# Patient Record
Sex: FEMALE | Race: OTHER
[De-identification: names, ages, dates, MRNs, and addresses within clinical notes are randomized per-mention and may not be internally consistent; named-entity substitution may affect disease eponyms.]

---

## 2019-09-16 ENCOUNTER — HOSPITAL ENCOUNTER (EMERGENCY)
Dept: HOSPITAL 62 - ER | Age: 19
Discharge: HOME | End: 2019-09-16
Payer: SELF-PAY

## 2019-09-16 VITALS — SYSTOLIC BLOOD PRESSURE: 113 MMHG | DIASTOLIC BLOOD PRESSURE: 64 MMHG

## 2019-09-16 DIAGNOSIS — O21.9: Primary | ICD-10-CM

## 2019-09-16 DIAGNOSIS — Z3A.12: ICD-10-CM

## 2019-09-16 LAB
ADD MANUAL DIFF: NO
ALBUMIN SERPL-MCNC: 4.7 G/DL (ref 3.7–5.6)
ALP SERPL-CCNC: 60 U/L (ref 50–135)
ANION GAP SERPL CALC-SCNC: 14 MMOL/L (ref 5–19)
APPEARANCE UR: (no result)
APTT PPP: (no result) S
AST SERPL-CCNC: 24 U/L (ref 5–30)
BASOPHILS # BLD AUTO: 0.1 10^3/UL (ref 0–0.2)
BASOPHILS NFR BLD AUTO: 0.4 % (ref 0–2)
BILIRUB DIRECT SERPL-MCNC: 0 MG/DL (ref 0–0.4)
BILIRUB SERPL-MCNC: 0.3 MG/DL (ref 0.2–1.3)
BILIRUB UR QL STRIP: NEGATIVE
BUN SERPL-MCNC: 6 MG/DL (ref 7–20)
CALCIUM: 10.1 MG/DL (ref 8.4–10.2)
CHLORIDE SERPL-SCNC: 99 MMOL/L (ref 98–107)
CO2 SERPL-SCNC: 24 MMOL/L (ref 22–30)
EOSINOPHIL # BLD AUTO: 0 10^3/UL (ref 0–0.6)
EOSINOPHIL NFR BLD AUTO: 0.2 % (ref 0–6)
ERYTHROCYTE [DISTWIDTH] IN BLOOD BY AUTOMATED COUNT: 11.8 % (ref 11.5–14)
GLUCOSE SERPL-MCNC: 94 MG/DL (ref 75–110)
GLUCOSE UR STRIP-MCNC: NEGATIVE MG/DL
HCT VFR BLD CALC: 41.7 % (ref 36–47)
HGB BLD-MCNC: 14.3 G/DL (ref 12–15.5)
KETONES UR STRIP-MCNC: 300 MG/DL
LYMPHOCYTES # BLD AUTO: 0.9 10^3/UL (ref 0.5–4.7)
LYMPHOCYTES NFR BLD AUTO: 6 % (ref 13–45)
MCH RBC QN AUTO: 30.8 PG (ref 27–33.4)
MCHC RBC AUTO-ENTMCNC: 34.3 G/DL (ref 32–36)
MCV RBC AUTO: 90 FL (ref 80–97)
MONOCYTES # BLD AUTO: 0.6 10^3/UL (ref 0.1–1.4)
MONOCYTES NFR BLD AUTO: 3.7 % (ref 3–13)
NEUTROPHILS # BLD AUTO: 14.2 10^3/UL (ref 1.7–8.2)
NEUTS SEG NFR BLD AUTO: 89.7 % (ref 42–78)
NITRITE UR QL STRIP: NEGATIVE
PH UR STRIP: 5 [PH] (ref 5–9)
PLATELET # BLD: 325 10^3/UL (ref 150–450)
POTASSIUM SERPL-SCNC: 4.2 MMOL/L (ref 3.6–5)
PROT SERPL-MCNC: 7.9 G/DL (ref 6.3–8.2)
PROT UR STRIP-MCNC: NEGATIVE MG/DL
RBC # BLD AUTO: 4.65 10^6/UL (ref 3.72–5.28)
SP GR UR STRIP: 1.03
TOTAL CELLS COUNTED % (AUTO): 100 %
UROBILINOGEN UR-MCNC: NEGATIVE MG/DL (ref ?–2)
WBC # BLD AUTO: 15.8 10^3/UL (ref 4–10.5)

## 2019-09-16 PROCEDURE — 96361 HYDRATE IV INFUSION ADD-ON: CPT

## 2019-09-16 PROCEDURE — 83690 ASSAY OF LIPASE: CPT

## 2019-09-16 PROCEDURE — 85025 COMPLETE CBC W/AUTO DIFF WBC: CPT

## 2019-09-16 PROCEDURE — 36415 COLL VENOUS BLD VENIPUNCTURE: CPT

## 2019-09-16 PROCEDURE — 87086 URINE CULTURE/COLONY COUNT: CPT

## 2019-09-16 PROCEDURE — 99284 EMERGENCY DEPT VISIT MOD MDM: CPT

## 2019-09-16 PROCEDURE — 80053 COMPREHEN METABOLIC PANEL: CPT

## 2019-09-16 PROCEDURE — 84702 CHORIONIC GONADOTROPIN TEST: CPT

## 2019-09-16 PROCEDURE — 81001 URINALYSIS AUTO W/SCOPE: CPT

## 2019-09-16 PROCEDURE — 96374 THER/PROPH/DIAG INJ IV PUSH: CPT

## 2019-09-16 NOTE — ER DOCUMENT REPORT
ED Medical Screen (RME)





- General


Chief Complaint: Abdominal Pain


Stated Complaint: NAUSEA/ABDOMINAL PAIN


Time Seen by Provider: 09/16/19 15:52


Mode of Arrival: Ambulatory


Information source: Patient


Notes: 





Patient is an otherwise healthy 18-year-old female G1, P0 presenting to the 

emergency department chief complaint of upper abdominal pain and vomiting.  

Patient reports she has had multiple episodes of vomiting today, denies any 

diarrhea, reports possible fever.  She denies any abnormal vaginal bleeding or 

discharge.  She thinks she is approximately 12 weeks pregnant.





Exam:


Patient alert, oriented, answering all questions appropriately, laughing during 

triage.


No acute distress noted.


Abdomen soft, nontender.





I have greeted and performed a rapid initial assessment of this patient.  A 

comprehensive ED assessment and evaluation of the patient, analysis of test 

results and completion of the medical decision making process will be conducted 

by additional ED providers.  I have specifically instructed the patient or 

family members with the patient to immediately return to any nursing staff 

should anything change in the patient's condition or with their chief complaint.





This medical record was dictated with voice recognizing software.  There may be 

grammatical, syntax errors that are unintended.





TRAVEL OUTSIDE OF THE [place] IN LAST 30 DAYS: No





- Related Data


Allergies/Adverse Reactions: 


                                        





No Known Allergies Allergy (Verified 09/16/19 15:21)


   











Past Medical History





- Social History


Frequency of alcohol use: None


Drug Abuse: None





Physical Exam





- Vital signs


Vitals: 





                                        











Temp Pulse Resp BP Pulse Ox


 


 98.2 F   81   16   125/76   100 


 


 09/16/19 15:30  09/16/19 15:30  09/16/19 15:30  09/16/19 15:30  09/16/19 15:30














Course





- Vital Signs


Vital signs: 





                                        











Temp Pulse Resp BP Pulse Ox


 


 98.2 F   81   16   125/76   100 


 


 09/16/19 15:30  09/16/19 15:30  09/16/19 15:30  09/16/19 15:30  09/16/19 15:30

## 2019-09-16 NOTE — ER DOCUMENT REPORT
ED General





- General


Chief Complaint: Abdominal Pain


Stated Complaint: NAUSEA/ABDOMINAL PAIN


Time Seen by Provider: 09/16/19 15:52


Primary Care Provider: 


LACHO RAMIREZ MD [ACTIVE STAFF] - Follow up as needed


Mode of Arrival: Ambulatory


TRAVEL OUTSIDE OF THE [place] IN LAST 30 DAYS: No





- HPI


Notes: 





Patient woke this morning presently 12 weeks pregnant she states that G1 with 

presently 5 episodes of vomiting nonbloody nonbilious and upright epigastric 

pain.  No known medical problems does not take any medications daily basis.  

Symptoms have improved in the emergency department after nausea medication.





- Related Data


Allergies/Adverse Reactions: 


                                        





No Known Allergies Allergy (Verified 09/16/19 15:21)


   











Past Medical History





- General


Information source: Patient





- Social History


Smoking Status: Never Smoker


Frequency of alcohol use: None


Drug Abuse: None


Family History: Reviewed & Not Pertinent


Patient has suicidal ideation: No


Patient has homicidal ideation: No





Review of Systems





- Review of Systems


Constitutional: No symptoms reported


EENT: No symptoms reported


Cardiovascular: No symptoms reported


Respiratory: No symptoms reported


Gastrointestinal: See HPI


Genitourinary: No symptoms reported


Female Genitourinary: No symptoms reported


Musculoskeletal: No symptoms reported


Skin: No symptoms reported


Hematologic/Lymphatic: No symptoms reported


Neurological/Psychological: No symptoms reported





Physical Exam





- Vital signs


Vitals: 


                                        











Temp Pulse Resp BP Pulse Ox


 


 98.2 F   81   16   125/76   100 


 


 09/16/19 15:30  09/16/19 15:30  09/16/19 15:30  09/16/19 15:30  09/16/19 15:30














- General


General appearance: Appears well, Alert





- HEENT


Head: Normocephalic, Atraumatic


Eyes: Normal


Conjunctiva: Normal


Cornea: Normal


Extraocular movements intact: Yes


Pupils: PERRL





- Respiratory


Respiratory status: No respiratory distress


Chest status: Nontender


Breath sounds: Normal





- Cardiovascular


Rhythm: Regular


Heart sounds: Normal auscultation


Murmur: No





- Abdominal


Inspection: Normal


Distension: No distension


Bowel sounds: Normal


Tenderness: Nontender





- Neurological


Neuro grossly intact: Yes


Cognition: Normal


Orientation: AAOx4





- Psychological


Associated symptoms: Normal affect





Course





- Re-evaluation


Re-evalutation: 





09/16/19 18:53


Patient found to have nonspecific leukocytosis.  She has had intermittent 

vomiting today.  She is asymptomatic emerge part with no abdominal pain at this 

time.  She has been having a sour taste in the throat.  Will provide Zantac as 

well as Reglan for nausea.  Referral provided she does not have one.  Return 

precautions provided.


09/16/19 18:55


Bedside ultrasound performed by me.  Inferior pregnancy identified with present 

fetal heart tones and movement.  No dysuria urine will be cultured





- Vital Signs


Vital signs: 


                                        











Temp Pulse Resp BP Pulse Ox


 


 98.1 F   84   16   113/64   100 


 


 09/16/19 19:29  09/16/19 19:29  09/16/19 19:29  09/16/19 19:29  09/16/19 19:29














- Laboratory


Result Diagrams: 


                                 09/16/19 16:13





                                 09/16/19 16:13


Laboratory results interpreted by me: 


                                        











  09/16/19 09/16/19 09/16/19





  16:13 16:13 16:13


 


WBC  15.8 H  


 


Lymph % (Auto)  6.0 L  


 


Absolute Neuts (auto)  14.2 H  


 


Seg Neutrophils %  89.7 H  


 


Sodium   136.7 L 


 


BUN   6 L 


 


Beta HCG, Quant   603848.00 H 


 


Urine Ketones    300 H


 


Urine Ascorbic Acid    40 H














Discharge





- Discharge


Clinical Impression: 


Nausea & vomiting


Qualifiers:


 Vomiting type: unspecified Vomiting Intractability: non-intractable Qualified 

Code(s): R11.2 - Nausea with vomiting, unspecified





Condition: Good


Disposition: HOME, SELF-CARE


Instructions:  Pregnancy ([facility]), Reglan ([facility]), Vomiting ([facility])


Prescriptions: 


Metoclopramide HCl [Reglan 10 mg Tablet] 1 tab PO ASDIR PRN #25 tablet


 PRN Reason: 


Ranitidine HCl [Zantac] 150 mg PO BID #30 tablet


Referrals: 


LACHO RAMIREZ MD [ACTIVE STAFF] - Follow up as needed

## 2019-10-08 ENCOUNTER — HOSPITAL ENCOUNTER (EMERGENCY)
Dept: HOSPITAL 62 - ER | Age: 19
Discharge: HOME | End: 2019-10-08
Payer: MEDICAID

## 2019-10-08 VITALS — DIASTOLIC BLOOD PRESSURE: 80 MMHG | SYSTOLIC BLOOD PRESSURE: 118 MMHG

## 2019-10-08 DIAGNOSIS — R25.2: ICD-10-CM

## 2019-10-08 DIAGNOSIS — Z3A.15: ICD-10-CM

## 2019-10-08 DIAGNOSIS — Z79.899: ICD-10-CM

## 2019-10-08 DIAGNOSIS — O26.892: ICD-10-CM

## 2019-10-08 DIAGNOSIS — Z87.891: ICD-10-CM

## 2019-10-08 DIAGNOSIS — O20.9: Primary | ICD-10-CM

## 2019-10-08 LAB
ADD MANUAL DIFF: NO
ALBUMIN SERPL-MCNC: 4 G/DL (ref 3.7–5.6)
ALP SERPL-CCNC: 50 U/L (ref 50–135)
ANION GAP SERPL CALC-SCNC: 11 MMOL/L (ref 5–19)
APPEARANCE UR: CLEAR
APTT PPP: YELLOW S
AST SERPL-CCNC: 26 U/L (ref 5–30)
BASOPHILS # BLD AUTO: 0 10^3/UL (ref 0–0.2)
BASOPHILS NFR BLD AUTO: 0.4 % (ref 0–2)
BILIRUB DIRECT SERPL-MCNC: 0.1 MG/DL (ref 0–0.4)
BILIRUB SERPL-MCNC: 0.2 MG/DL (ref 0.2–1.3)
BILIRUB UR QL STRIP: NEGATIVE
BUN SERPL-MCNC: 7 MG/DL (ref 7–20)
CALCIUM: 9.6 MG/DL (ref 8.4–10.2)
CHLORIDE SERPL-SCNC: 101 MMOL/L (ref 98–107)
CO2 SERPL-SCNC: 23 MMOL/L (ref 22–30)
EOSINOPHIL # BLD AUTO: 0.1 10^3/UL (ref 0–0.6)
EOSINOPHIL NFR BLD AUTO: 1.2 % (ref 0–6)
ERYTHROCYTE [DISTWIDTH] IN BLOOD BY AUTOMATED COUNT: 11.6 % (ref 11.5–14)
GLUCOSE SERPL-MCNC: 101 MG/DL (ref 75–110)
GLUCOSE UR STRIP-MCNC: NEGATIVE MG/DL
HCT VFR BLD CALC: 37.9 % (ref 36–47)
HGB BLD-MCNC: 13.1 G/DL (ref 12–15.5)
KETONES UR STRIP-MCNC: NEGATIVE MG/DL
LYMPHOCYTES # BLD AUTO: 1.5 10^3/UL (ref 0.5–4.7)
LYMPHOCYTES NFR BLD AUTO: 13.5 % (ref 13–45)
MCH RBC QN AUTO: 31.1 PG (ref 27–33.4)
MCHC RBC AUTO-ENTMCNC: 34.4 G/DL (ref 32–36)
MCV RBC AUTO: 90 FL (ref 80–97)
MONOCYTES # BLD AUTO: 0.7 10^3/UL (ref 0.1–1.4)
MONOCYTES NFR BLD AUTO: 6 % (ref 3–13)
NEUTROPHILS # BLD AUTO: 8.6 10^3/UL (ref 1.7–8.2)
NEUTS SEG NFR BLD AUTO: 78.9 % (ref 42–78)
NITRITE UR QL STRIP: NEGATIVE
PH UR STRIP: 6 [PH] (ref 5–9)
PLATELET # BLD: 286 10^3/UL (ref 150–450)
POTASSIUM SERPL-SCNC: 3.9 MMOL/L (ref 3.6–5)
PROT SERPL-MCNC: 7.3 G/DL (ref 6.3–8.2)
PROT UR STRIP-MCNC: NEGATIVE MG/DL
RBC # BLD AUTO: 4.2 10^6/UL (ref 3.72–5.28)
SP GR UR STRIP: 1.01
TOTAL CELLS COUNTED % (AUTO): 100 %
UROBILINOGEN UR-MCNC: NEGATIVE MG/DL (ref ?–2)
WBC # BLD AUTO: 10.9 10^3/UL (ref 4–10.5)

## 2019-10-08 PROCEDURE — 86901 BLOOD TYPING SEROLOGIC RH(D): CPT

## 2019-10-08 PROCEDURE — 85025 COMPLETE CBC W/AUTO DIFF WBC: CPT

## 2019-10-08 PROCEDURE — 80053 COMPREHEN METABOLIC PANEL: CPT

## 2019-10-08 PROCEDURE — 76805 OB US >/= 14 WKS SNGL FETUS: CPT

## 2019-10-08 PROCEDURE — 93976 VASCULAR STUDY: CPT

## 2019-10-08 PROCEDURE — 86900 BLOOD TYPING SEROLOGIC ABO: CPT

## 2019-10-08 PROCEDURE — 99284 EMERGENCY DEPT VISIT MOD MDM: CPT

## 2019-10-08 PROCEDURE — 36415 COLL VENOUS BLD VENIPUNCTURE: CPT

## 2019-10-08 PROCEDURE — 84702 CHORIONIC GONADOTROPIN TEST: CPT

## 2019-10-08 PROCEDURE — 81001 URINALYSIS AUTO W/SCOPE: CPT

## 2019-10-08 NOTE — ER DOCUMENT REPORT
ED Medical Screen (RME)





- General


Stated Complaint: VAGINAL BLEEDING


Time Seen by Provider: 10/08/19 16:13


Notes: 





Patient is a G1, P0 14-weeks pregnant 18-year-old female who presents the 

emergency with vaginal bleeding.  Her bleeding started about an hour ago.  She 

states that she lifted to ice buckets yesterday and ended up having some vaginal

bleeding this morning.  Patient admits to having sex 2 days ago and had some 

bleeding then also.  She is taking prenatal vitamins.  Denies any past medical 

history.  Denies any abdominal pain.  Denies dysuria, pelvic pain, vaginal 

discharge, or vaginal odors.





Exam: Soft nontender abdomen.  Exam limited due to patient in sitting position.





I have greeted and performed a rapid initial assessment of this patient.  A 

comprehensive ED assessment and evaluation of the patient, analysis of test 

results and completion of medical decision making process will be conducted by 

an additional ED providers.  


TRAVEL OUTSIDE OF THE [place] IN LAST 30 DAYS: No





- Related Data


Allergies/Adverse Reactions: 


                                        





No Known Allergies Allergy (Verified 10/08/19 16:09)


   











Past Medical History





- Social History


Chew tobacco use (# tins/day): No


Frequency of alcohol use: None


Drug Abuse: None





Physical Exam





- Vital signs


Vitals: 





                                        











Temp Pulse Resp BP Pulse Ox


 


 98.7 F   95 H  15   136/82 H  100 


 


 10/08/19 15:28  10/08/19 15:28  10/08/19 15:28  10/08/19 15:28  10/08/19 15:28














Course





- Vital Signs


Vital signs: 





                                        











Temp Pulse Resp BP Pulse Ox


 


 98.7 F   95 H  15   136/82 H  100 


 


 10/08/19 15:28  10/08/19 15:28  10/08/19 15:28  10/08/19 15:28  10/08/19 15:28

## 2019-10-08 NOTE — ER DOCUMENT REPORT
ED GI/





- General


Chief Complaint: Vaginal Bleeding


Stated Complaint: VAGINAL BLEEDING


Time Seen by Provider: 10/08/19 16:13


Mode of Arrival: Ambulatory


Information source: Patient


Notes: 





18-year-old female presented to ED for complaint of vaginal bleeding.  She 

states that started about an hour before coming in.  She states she lifted some 

ice buckets yesterday and then had some vaginal bleeding this morning.  She also

had some sexual intercourse 2 days ago.  She states she is taking her prenatal 

vitamins.  She is  1 para 0 and 15 weeks pregnant.  Patient is alert 

oriented respirations regular nonlabored speaking in full sentences.  She states

that the vaginal bleeding has stopped and she is not having any pain.


TRAVEL OUTSIDE OF THE [place] IN LAST 30 DAYS: No





- HPI


Patient complains to provider of: Pregnant, Vaginal bleeding


Onset: This morning


Timing/Duration: Gone


Quality of pain: No pain


Severity at maximum: Mild


Severity in ED: None


Pain Level: Denies


Location: Pelvis


Vaginal bleeding (Compared to normal period): Lighter


: 1


Para: 0


Fetal heart tones (bpm): 169


ABO type: A


Rh factor: Positive


OB ultrasound done: Yes


Associated symptoms: Other - Mild cramping vaginal bleeding all resolved


Exacerbated by: Denies


Relieved by: Denies


Recently seen / treated by doctor: No





- Related Data


Allergies/Adverse Reactions: 


                                        





No Known Allergies Allergy (Verified 10/08/19 16:09)


   











Past Medical History





- General


Information source: Patient


Last Menstrual Period: 18





- Social History


Smoking Status: Former Smoker - As


Chew tobacco use (# tins/day): No


Frequency of alcohol use: None


Drug Abuse: None


Occupation: 


Lives with: Spouse/Significant other


Family History: Reviewed & Not Pertinent


Patient has suicidal ideation: No


Patient has homicidal ideation: No





- Past Medical History


Cardiac Medical History: Reports: None


Pulmonary Medical History: Reports: None


EENT Medical History: Reports: None


Neurological Medical History: Reports: None


Endocrine Medical History: Reports: None


Renal/ Medical History: Reports: None


Malignancy Medical History: Reports: None


GI Medical History: Reports: None


Musculoskeletal Medical History: Reports None


Skin Medical History: Reports None


Psychiatric Medical History: Reports: None


Traumatic Medical History: Reports: None


Infectious Medical History: Reports: None


Surgical Hx: Negative


Past Surgical History: Reports: None





- Immunizations


Immunizations up to date: Yes


Hx Diphtheria, Pertussis, Tetanus Vaccination: Yes





Review of Systems





- Review of Systems


Constitutional: No symptoms reported


EENT: No symptoms reported


Cardiovascular: No symptoms reported


Respiratory: No symptoms reported


Gastrointestinal: No symptoms reported


Genitourinary: No symptoms reported


Female Genitourinary: Pregnant, Vaginal bleeding


Musculoskeletal: No symptoms reported


Skin: No symptoms reported


Hematologic/Lymphatic: No symptoms reported


Neurological/Psychological: No symptoms reported


-: Yes All other systems reviewed and negative





Physical Exam





- Vital signs


Vitals: 





                                        











Temp Pulse Resp BP Pulse Ox


 


 98.7 F   95 H  15   136/82 H  100 


 


 10/08/19 15:28  10/08/19 15:28  10/08/19 15:28  10/08/19 15:28  10/08/19 15:28











Interpretation: Normal





- General


General appearance: Appears well, Alert





- HEENT


Head: Normocephalic, Atraumatic


Eyes: Normal


Pupils: PERRL





- Respiratory


Respiratory status: No respiratory distress


Chest status: Nontender


Breath sounds: Normal


Chest palpation: Normal





- Cardiovascular


Rhythm: Regular


Heart sounds: Normal auscultation


Murmur: No





- Abdominal


Inspection: Normal, Gravid female


Distension: No distension


Bowel sounds: Normal


Tenderness: Nontender.  No: Tender


Organomegaly: No organomegaly





- Back


Back: Normal, Nontender





- Extremities


General upper extremity: Normal inspection, Nontender, Normal color, Normal ROM,

Normal temperature


General lower extremity: Normal inspection, Nontender, Normal color, Normal ROM,

Normal temperature, Normal weight bearing.  No: Syl's sign





- Neurological


Neuro grossly intact: Yes


Cognition: Normal


Orientation: AAOx4


[place] Coma Scale Eye Opening: Spontaneous


[place] Coma Scale Verbal: Oriented


[place] Coma Scale Motor: Obeys Commands


[place] Coma Scale Total: 15


Speech: Normal


Motor strength normal: LUE, RUE, LLE, RLE


Sensory: Normal





- Psychological


Associated symptoms: Normal affect, Normal mood





- Skin


Skin Temperature: Warm


Skin Moisture: Dry


Skin Color: Normal





Course





- Re-evaluation


Re-evalutation: 





10/08/19 19:52


Labs and ultrasound discussed with patient and results of labs and ultrasound 

given to patient to follow-up with primary care doctor.  Patient to follow-up 

with OB/GYN as soon as her Medicaid goes through.  Patient is alert oriented 

respirations regular nonlabored speaking in full sentences she will be 

discharged.





- Vital Signs


Vital signs: 





                                        











Temp Pulse Resp BP Pulse Ox


 


 98.7 F   95 H  15   136/82 H  100 


 


 10/08/19 15:28  10/08/19 15:28  10/08/19 15:28  10/08/19 15:28  10/08/19 15:28














- Laboratory


Result Diagrams: 


                                 10/08/19 16:28





                                 10/08/19 16:28


Laboratory results interpreted by me: 





                                        











  10/08/19 10/08/19 10/08/19





  16:28 16:28 16:28


 


WBC  10.9 H  


 


Absolute Neuts (auto)  8.6 H  


 


Seg Neutrophils %  78.9 H  


 


Sodium   


 


Beta HCG, Quant   96952.00 H 


 


Urine Blood    LARGE H














  10/08/19





  16:28


 


WBC 


 


Absolute Neuts (auto) 


 


Seg Neutrophils % 


 


Sodium  135.0 L


 


Beta HCG, Quant 


 


Urine Blood 














- Diagnostic Test


Radiology reviewed: Image reviewed, Reports reviewed





Discharge





- Discharge


Clinical Impression: 


 Vaginal bleeding in pregnancy





Condition: Stable


Disposition: HOME, SELF-CARE


Additional Instructions: 


PREGNANCY:





     You are pregnant.  Prenatal care is best started as early in pregnancy as 

possible.


     If you're unsure about continuing this pregnancy, you should discuss this 

with your physician or with counsellors at [facility].


     You should take only medications approved by your physician. Acetaminophen 

can safely be taken for minor pains.  As a rule, medication for chronic 

conditions such as asthma or seizures can safely be continued.  You should di

scuss with the physician every medicine you take.


     Any regular exercise program can be continued.  Talk to your physician, 

however, before engaging in competitive or demanding sports.


     Alcohol, smoking, and "street drugs" are dangerous to your baby. Cocaine is

especially dangerous.  Don't use any illicit drugs!








BLEEDING DURING EARLY PREGNANCY:





     You have been evaluated for passing blood while pregnant. While we take 

this symptom very seriously, most women with your degree of bleeding will go on 

to have a perfectly normal baby. At this time, there is no indication that a 

miscarriage will occur. (A miscarriage occurs when the fetus is abnormal.  There

is no medicine or treatment to prevent it.)


     A more serious cause of bleeding is tubal (or ectopic) pregnancy. An 

ultrasound usually can show whether the pregnancy is in the uterus or in the 

tube. Sometimes in early pregnancy, no fetus is seen. In this case, careful 

follow-up, including repeat blood tests and repeat ultrasound, is necessary.


     Do not douche or have sex for at least a week, or until OK'd by the doctor.

Don't use tampons.


     Call the doctor or return for re-examination if there is an increase in ble

eding or cramping, extreme weakness, fainting, new abdominal pain, fever, or 

passage of tissue.





Acetaminophen





     Acetaminophen may be taken for pain relief or fever control. It's much 

safer than aspirin, offering a wider range of "safe" dosages.  It is safe during

pregnancy.  Some brand names are Tylenol, Panadol, Datril, Anacin 3, Tempra, and

Liquiprin. Acetaminophen can be repeated every four hours.  The following are 

maximum recommended dosages:





WEIGHT         Dose             Drops                  Elixir        

Chewable(80mg)


(LBS.)                            drprs=droppers    tsp=teaspoon


6                 40 mg            .4 ml (1/2)


6-11            80 mg            .8 ml (full)            1/2   tsp           1  

    tab


12-16         120 mg           1 1/2 drprs            3/4   tsp           1 1/2 

tabs


17-23         160 mg             2  drprs              1      tsp           2   

   tabs


24-30         240 mg             3  drprs              1 1/2 tsp           3    

  tabs


30-35         320 mg                                     2       tsp           4

      tabs


36-41         360 mg                                     2 1/4 tsp           4 

1/2  tabs


42-47         400 mg                                     2 1/2 tsp           5  

    tabs


48-53         480 mg                                     3       tsp          6 

     tabs


54-59         520 mg                                     3  1/4 tsp          6 

1/2 tabs


60-64         560 mg                                     3  1/2 tsp          7  

   tabs 


65-70         600 mg                                     3  3/4 tsp          7 

1/2 tabs


71-76         640 mg                                     4       tsp           8

     tabs


77-82         720 mg                                     4 1/2  tsp           9 

    tabs


83-88         800 mg                                     5       tsp           

10     tabs





>89 pounds or adults          650 mg to 900 mg 





Acetaminophen can be repeated every four hours. Maximum daily dose not to exceed

4000 mg.





   These maximum recommended dosages are slightly higher than the dosages 

written on the product container, but these dosages are very safe and well below

the toxic dosage for acetaminophen.





Your blood type is A+.





I have given you a copy of the lab work and the ultrasound report please take 

these with you to your follow-up OB/GYN appointment.  You are now over 15 weeks 

pregnant so that should be getting you into a OB/GYN soon.








FOLLOW-UP CARE:


If you have been referred to a physician for follow-up care, call the 

physicians office for an appointment as you were instructed or within the next 

two days.  If you experience worsening or a significant change in your symptoms 

(very heavy bleeding with large clots of blood, passage of tissue, more severe 

abdominal / pelvic pain or cramping, feeling faint or severe weakness, fever, 

etc.), notify the physician immediately or return to the Emergency Department at

any time for re-evaluation.





OBSTETRIC-GYNECOLOGIC (OB-GYN) PHYSICIANS IN [place]:








[facility]


    [address] [place] [address]


    [phone]





For active duty and  dependents diagnosed with a threatened  or 

miscarriage, you should follow up in the following manner:





      Standard patients who have a local civilian provider should follow 

up with that provider.





      Patients of the [facility] Practice [facility] should call your Team Nurse at 8:00

am the following morning for further instructions.





     If you are neither a  Standard patient nor a patient of the Family 

Practice Clinic, you should follow up at the [facility] ([facility]

 Patients already enrolled in the [facility] OB Clinic,  Prime patients not 

assigned to the Family Practice Clinic, and Active Duty patients not assigned to

Family Practice Clinic should report to the [facility] Lab at 8:00 am the next morning

that the [facility] OB Clinic is open and then you will be seen in the OB Clinic at 

11:00 am.


Forms:  Return to Work


Referrals: 


[facility] [Provider Group] - Follow up as needed

## 2019-10-08 NOTE — RADIOLOGY REPORT (SQ)
EXAM DESCRIPTION:  U/S OB 14+ TA/1 GEST W/DOPPLER



COMPLETED DATE/TIME:  10/8/2019 6:07 pm



REASON FOR STUDY:  vaginal bleeding



COMPARISON:  None.



TECHNIQUE:  Static and Dynamic grayscale imaging performed of gravid uterus using transabdominal appr
oach.  Additional selected color Doppler and spectral images recorded.  All stored on PACS.



LIMITATIONS:  None.



FINDINGS:  FETUSES SEEN:1

EGA: 15 weeks 0 days  Calculated using BPD,FL,HC,AC documented on images. 5 weeks discrepancy from cl
inical dates.

ALLY: 3/31/2020

EFW: Not calculated. Grams

PERCENTILE: Not calculated.

LV P: 2.4 cm

PLACENTA: Anterior  GRADE: I

FETAL PRESENTATION: Cephalic.

FETAL ANATOMY:

FETAL HEART RATE: 169 beats per minute.

FOUR CHAMBER HEART: Visualized.

THREE VESSEL CORD: Not verified

CORD INSERTION: Not seen

KIDNEYS AND BLADDER: Kidneys not seen.  Bladder is unremarkable.

STOMACH: Visualized. Appears normal.

SPINE: Normal as visualized.

BRAIN AND LATERAL VENTRICLES: Not well seen.

OTHER: No other significant finding.

MATERNAL ADNEXA: Maternal ovaries not visualized.

CERVICAL LENGTH: 2.8 cm.   Closed.

OTHER: No other significant finding.



IMPRESSION:  LIVING INTRAUTERINE PREGNANCY.

ESTIMATED GESTATIONAL AGE 15 weeks 0 days.

No visualized anomalies.  Limited evaluation of fetal anatomy.

Trimester of pregnancy: Second trimester - 13 weeks 1 day to 27 weeks 6 days.



TECHNICAL DOCUMENTATION:  JOB ID:  [id number]

 [employer]- All Rights Reserved



Reading location - IP/workstation name: [email or web address]

## 2020-03-27 ENCOUNTER — HOSPITAL ENCOUNTER (OUTPATIENT)
Dept: HOSPITAL 62 - LC | Age: 20
LOS: 1 days | Discharge: HOME | End: 2020-03-28
Attending: OBSTETRICS & GYNECOLOGY
Payer: MEDICAID

## 2020-03-27 DIAGNOSIS — Z3A.39: ICD-10-CM

## 2020-03-27 DIAGNOSIS — O47.1: Primary | ICD-10-CM

## 2020-03-27 PROCEDURE — 4A1HXCZ MONITORING OF PRODUCTS OF CONCEPTION, CARDIAC RATE, EXTERNAL APPROACH: ICD-10-PCS | Performed by: OBSTETRICS & GYNECOLOGY

## 2020-03-27 PROCEDURE — 80307 DRUG TEST PRSMV CHEM ANLYZR: CPT

## 2020-03-27 PROCEDURE — 81005 URINALYSIS: CPT

## 2020-03-27 PROCEDURE — 59025 FETAL NON-STRESS TEST: CPT

## 2020-03-28 ENCOUNTER — HOSPITAL ENCOUNTER (INPATIENT)
Dept: HOSPITAL 62 - LC | Age: 20
LOS: 2 days | Discharge: HOME | End: 2020-03-30
Attending: OBSTETRICS & GYNECOLOGY | Admitting: OBSTETRICS & GYNECOLOGY
Payer: MEDICAID

## 2020-03-28 DIAGNOSIS — Z3A.39: ICD-10-CM

## 2020-03-28 DIAGNOSIS — Z23: ICD-10-CM

## 2020-03-28 LAB
ADD MANUAL DIFF: NO
APPEARANCE UR: (no result)
APPEARANCE UR: CLEAR
APTT PPP: (no result) S
APTT PPP: YELLOW S
BARBITURATES UR QL SCN: NEGATIVE
BARBITURATES UR QL SCN: NEGATIVE
BASOPHILS # BLD AUTO: 0 10^3/UL (ref 0–0.2)
BASOPHILS NFR BLD AUTO: 0.2 % (ref 0–2)
BILIRUB UR QL STRIP: NEGATIVE
BILIRUB UR QL STRIP: NEGATIVE
EOSINOPHIL # BLD AUTO: 0 10^3/UL (ref 0–0.6)
EOSINOPHIL NFR BLD AUTO: 0.2 % (ref 0–6)
ERYTHROCYTE [DISTWIDTH] IN BLOOD BY AUTOMATED COUNT: 11.9 % (ref 11.5–14)
GLUCOSE UR STRIP-MCNC: NEGATIVE MG/DL
GLUCOSE UR STRIP-MCNC: NEGATIVE MG/DL
HCT VFR BLD CALC: 36.8 % (ref 36–47)
HGB BLD-MCNC: 12.6 G/DL (ref 12–15.5)
KETONES UR STRIP-MCNC: NEGATIVE MG/DL
KETONES UR STRIP-MCNC: NEGATIVE MG/DL
LYMPHOCYTES # BLD AUTO: 1.5 10^3/UL (ref 0.5–4.7)
LYMPHOCYTES NFR BLD AUTO: 11.1 % (ref 13–45)
MCH RBC QN AUTO: 31.7 PG (ref 27–33.4)
MCHC RBC AUTO-ENTMCNC: 34.3 G/DL (ref 32–36)
MCV RBC AUTO: 92 FL (ref 80–97)
METHADONE UR QL SCN: NEGATIVE
METHADONE UR QL SCN: NEGATIVE
MONOCYTES # BLD AUTO: 1.1 10^3/UL (ref 0.1–1.4)
MONOCYTES NFR BLD AUTO: 7.8 % (ref 3–13)
NEUTROPHILS # BLD AUTO: 10.9 10^3/UL (ref 1.7–8.2)
NEUTS SEG NFR BLD AUTO: 80.7 % (ref 42–78)
NITRITE UR QL STRIP: NEGATIVE
NITRITE UR QL STRIP: NEGATIVE
PCP UR QL SCN: NEGATIVE
PCP UR QL SCN: NEGATIVE
PH UR STRIP: 8 [PH] (ref 5–9)
PH UR STRIP: 8 [PH] (ref 5–9)
PLATELET # BLD: 194 10^3/UL (ref 150–450)
PROT UR STRIP-MCNC: NEGATIVE MG/DL
PROT UR STRIP-MCNC: NEGATIVE MG/DL
RBC # BLD AUTO: 3.99 10^6/UL (ref 3.72–5.28)
SP GR UR STRIP: 1
SP GR UR STRIP: 1
TOTAL CELLS COUNTED % (AUTO): 100 %
URINE AMPHETAMINES SCREEN: NEGATIVE
URINE AMPHETAMINES SCREEN: NEGATIVE
URINE BENZODIAZEPINES SCREEN: NEGATIVE
URINE BENZODIAZEPINES SCREEN: NEGATIVE
URINE COCAINE SCREEN: NEGATIVE
URINE COCAINE SCREEN: NEGATIVE
URINE MARIJUANA (THC) SCREEN: NEGATIVE
URINE MARIJUANA (THC) SCREEN: NEGATIVE
UROBILINOGEN UR-MCNC: NEGATIVE MG/DL (ref ?–2)
UROBILINOGEN UR-MCNC: NEGATIVE MG/DL (ref ?–2)
WBC # BLD AUTO: 13.5 10^3/UL (ref 4–10.5)

## 2020-03-28 PROCEDURE — 86592 SYPHILIS TEST NON-TREP QUAL: CPT

## 2020-03-28 PROCEDURE — 86850 RBC ANTIBODY SCREEN: CPT

## 2020-03-28 PROCEDURE — 86900 BLOOD TYPING SEROLOGIC ABO: CPT

## 2020-03-28 PROCEDURE — 85025 COMPLETE CBC W/AUTO DIFF WBC: CPT

## 2020-03-28 PROCEDURE — 80307 DRUG TEST PRSMV CHEM ANLYZR: CPT

## 2020-03-28 PROCEDURE — 85027 COMPLETE CBC AUTOMATED: CPT

## 2020-03-28 PROCEDURE — 90715 TDAP VACCINE 7 YRS/> IM: CPT

## 2020-03-28 PROCEDURE — 36415 COLL VENOUS BLD VENIPUNCTURE: CPT

## 2020-03-28 PROCEDURE — 81005 URINALYSIS: CPT

## 2020-03-28 PROCEDURE — 86901 BLOOD TYPING SEROLOGIC RH(D): CPT

## 2020-03-28 RX ADMIN — FAMOTIDINE SCH: 20 TABLET, FILM COATED ORAL at 23:46

## 2020-03-28 NOTE — DELIVERY SUMMARY
=================================================================

[place] Sum A-C

=================================================================

Datetime Report Generated by CPN: 2020 23:33

   

   

=================================================================

DELIVERY PERSONNEL

=================================================================

   

DELIVERY PERSONNEL:  [id number]

Delivery Doctor::  Marni Rueda MD

Labor and Delivery Nurse::  Dagn Corona RN

Labor and Delivery Nurse::  Vanessa Escobar RN

Scrub Tech/CNA:  Angiemarcelle Belcher, ST

   

=================================================================

MATERNAL INFORMATION

=================================================================

   

Delivery Anesthesia:  None

Medications After Delivery:  Pitocin Bolus-Please Comment; Cytotec

   1000mcg Per Rectum/Vagina

Meds After Delivery Comment:  Pitocin 20 units/1000 ML bolus started

   following placenta

Estimated  Blood Loss (ml):  40

Maternal Complications:  None

Provider Comments:  VFI delivered in MALOU presentation.  No nuchal cord.

   Shoulders and body delivered without difficulty.  Cord doubly

   clamped and cut and infant to maternal abdomen for NRP.  Placenta

   delivered intact spontaneously.  FF at U.  Cytotec 1000mcg per

   rectum given due to uterine atony.  Mother and baby stable upon

   provider leaving the room

   

=================================================================

LABOR SUMMARY

=================================================================

   

EDC:  2020 00:00

No. Babies in Womb:  1

 Attempted:  No

   

=================================================================

LABOR INFORMATION

=================================================================

   

Reason for Induction:  Not Applicable

Onset of Labor:  2020 16:30

Complete Dilatation:  2020 21:12

Cervical Ripening Agents:  Cytotec @ 1000

Oxytocin:  N/A

Group B Beta Strep:  negative

Antibiotics # of Doses:  0

Antibiotics Time of Last Dose:  n/a

Name of Antibiotic Given:  n/a

Steroids Given:  None

Reason Steroids Not Administered:  Not Applicable

   

=================================================================

MEMBRANES

=================================================================

   

Membranes Rupture Method:  Artificial

Rupture of Membranes:  2020 17:10

Length of Rupture (hr):  4.30

Amniotic Fluid Color:  Clear

Amniotic Fluid Amount:  Small

Amniotic Fluid Odor:  Normal

   

=================================================================

STAGES OF LABOR

=================================================================

   

Stage 1 hr:  4

Stage 1 min:  42

Stage 2 hr:  0

Stage 2 min:  16

Stage 3 hr:  0

Stage 3 min:  8

Total Time in Labor hr:  5

Total Time in Labor min:  6

   

=================================================================

VAGINAL DELIVERY

=================================================================

   

Episiotomy:  None

Laceration #1:  None

Laceration Extension #1:  N/A

Sponge Count Correct:  Yes

Sharps Count Correct:  Yes

   

=================================================================

CSECTION DELIVERY

=================================================================

   

Primary Indication:  N/A

Secondary Indication:  N/A

CSection Incidence:  N/A

Labor:  N/A

Elective:  N/A

   

=================================================================

BABY A INFORMATION

=================================================================

   

Infant Delivery Date/Time:  2020 21:28

Method of Delivery:  Vaginal

Nurse Controlled Delivery:  No

Born in Route :  No

:  N/A

Forceps:  N/A

Vacuum Extraction:  N/A

Shoulder Dystocia :  No

   

=================================================================

PRESENTATION/POSITION BABY A

=================================================================

   

Presentation:  Cephalic

Cephalic Presentation:  Vertex

Vertex Position:  Left Occipital Anterior

Breech Presentation:  N/A

   

=================================================================

PLACENTA INFORMATION BABY A

=================================================================

   

Placenta Delivery Time :  2020 21:36

Placenta Method of Delivery:  Spontaneous

Placenta Status:  Delivered

   

=================================================================

APGAR SCORES BABY A

=================================================================

   

Heart Rate 1 min:  >100 bpm

Resp Effort 1 min:  Good Cry

Reflex Irritability 1 min:  Cough or Sneeze or Pulls Away

Muscle Tone 1 min:  Active Motion

Color 1 min:  Blue/Pale

Resuscitation Effort 1 min:  Tactile Stimulation

APGAR SCORE 1 MIN:  8

Heart Rate 5 min:  >100 bpm

Resp Effort 5 min:  Good Cry

Reflex Irritability 5 min:  Cough or Sneeze or Pulls Away

Muscle Tone 5 min:  Active Motion

Color 5 min:  Body Pink, Extremities Blue

Resuscitation Effort 5 min:  Tactile Stimulation

APGAR SCORE 5 MIN:  9

   

=================================================================

INFANT INFORMATION BABY A

=================================================================

   

Gestational Age at Delivery:  39.4

Gestational Status:  Full Term- 39- 40.6 Weeks

Infant Outcome :  Liveborn

Infant Condition :  Stable

Infant Sex:  Female

   

=================================================================

IDENTIFICATION BABY A

=================================================================

   

Infant Verification Date/Time:  2020 21:38

ID Band Number:  [id number]

Mother's Name Verified:  Yes

Infant Medical Record Number:  [id number]

RN Verifying Infant:  KRaysa Anneco, RN

Additional Verifying Personnel:  ARaysa Escobar, RN

   

=================================================================

WEIGHT/LENGTH BABY A

=================================================================

   

Infant Birthweight (gm):  3520

Infant Weight (lb):  7

Infant Weight (oz):  12

Infant Length (in):  20.50

Infant Length (cm):  52.07

   

=================================================================

CORD INFORMATION BABY A

=================================================================

   

No. Cord Vessels:  3

Nuchal Cord :  N/A

Cord Blood Taken:  Yes-For Storage (Mom's Blood type +)

Infant Suction:  Mouth; Nose

   

=================================================================

ASSESSMENT BABY A

=================================================================

   

Infant Complications:  None

Physical Findings at Delivery:  Within Normal Limits

Physical Findings- Other:  see initial nursery assessment

Infant Respirations:  Appears Normal

Skin to Skin:  Yes

Skin to Skin Time (min):  60

Neonatologist/ALS Called :  No

Infant Care By:  PABLO Escobar RN

Transferred To:  [facility]

   

=================================================================

BABY B INFORMATION

=================================================================

   

 :  N/A

   

=================================================================

SIGNATURES

=================================================================

   

Signature:  Electronically signed by Marni Rueda MD ([facility]OFKE) on

   3/28/2020 at 22:37  with User ID: [email or web address]

## 2020-03-28 NOTE — NON STRESS TEST REPORT
=================================================================

Non Stress Test

=================================================================

Datetime Report Generated by CPN: 03/28/2020 16:15

   

   

=================================================================

DEMOGRAPHIC

=================================================================

   

Test Number:  1

EGA NST:  39.3

   

=================================================================

INDICATION

=================================================================

   

Indication for Study (NST) Other:  lc

   

=================================================================

VITAL SIGNS

=================================================================

   

Temperature - NST:  98.0

Pulse - NST:  93

RESP - NST:  16

NBPSYS NST:  120

NBPDIA NST:  57

   

=================================================================

URINE RESULTS

=================================================================

   

Urine Protein, NST:  Negative

Urine Ketones - NST:  Negative

Urine Glucose - NST:  Negative

Urine Blood - NST:  Negative

   

=================================================================

MONITORING

=================================================================

   

Monitor Explained:  Monitor Explained; Test Explained; Patient

   Verbalized Understanding

Time on Monitor:  03/27/2020 23:56

Time off Monitor:  03/28/2020 00:57

NST Duration:  61

   

=================================================================

NST INTERVENTIONS

=================================================================

   

NST Interventions:  PO Hydration

Physician Notified NST:  dr. avery

BABY A:  [id number]

   

=================================================================

BABY A

=================================================================

   

Fetal Movement :  Present

Contraction Frequency :  2-7

FHR Baseline :  140

Accelerations :  15X15

Decelerations :  None

Variability :  Moderate 6-25bpm

NST Review:  Meets Criteria for Reactive NST

NST Review and Verified By :  PABLO Escobar RN

NST Results:  Reactive

   

=================================================================

NST REPORT

=================================================================

   

Report Trigger:  Send Report

## 2020-03-28 NOTE — ADMISSION PHYSICAL
=================================================================



=================================================================

Datetime Report Generated by CPN: 2020 17:07

   

   

=================================================================

CURRENT ADMISSION

=================================================================

   

Chief Complaint:  Uterine Contractions

Indication for Induction:  Not Applicable

Admit Impression :  Term, Intrauterine Pregnancy; Active Labor; Intact

   Membranes

Admit Plan:  Admit to Unit; Initiate Labor Protocol

   

=================================================================

ALLERGIES

=================================================================

   

Medication Allergies:  No

Medication Allergies:  No Known Allergies (2020)

Latex:  No Latex Allergies

Food Allergies:  denies 

Environmental Allergies:  denies 

   

=================================================================

OBSTETRICAL HISTORY

=================================================================

   

EDC:  2020 00:00

:  1

Para:  0

Term:  0

:  0

SAB:  0

IAB:  0

Ectopic:  0

Livin

Cesareans:  0

VBACs:  0

Multiple Births:  0

Gestational Diabetes:  No

Rh Sensitization:  No

Incompetent Cervix:  No

ABIMBOLA:  No

Infertility:  No

ART Treatment:  No

Uterine Anomaly:  No

IUGR:  No

Hx Previous C/S:  No

Macrosomia:  No

Hx Loss/Stillborn:  No

PIH:  No

Hx  Death:  No

Placenta Previa/Abruption:  No

Depression/PP Depression:  No

PTL/PROM:  No

Post Partum Hemorrhage:  No

Current Pregnancy Procedures:  Ultrasound; NST

Obstetrical History Comments:  g1-current pregnancy

   

=================================================================

***SEE PRENATAL RECORDS***

=================================================================

   

Alcohol:  No

Marijuana :  No

Cocaine:  No

Other Illicit Drugs:  No

Cigarettes:  Never Smoker. [id number]

   

=================================================================

MEDICAL HISTORY

=================================================================

   

Diabetes:  No

Blood Transfusion:  No

Pulmonary Disease (Asthma, TB):  No

Breast Disease:  No

Hypertension:  No

Gyn Surgery:  No

Heart Disease:  No

Hosp/Surgery:  No

Autoimmune Disorder:  No

Anesthetic Complications:  No

Kidney Disease:  No

Abnormal Pap Smear:  No

Neuro/Epilepsy:  No

Psychiatric Disorders:  No

Other Medical Diseases:  No

Hepatitis/Liver Disease:  No

Significant Family History:  No

Varicosities/Phlebitis:  No

Trauma/Violence :  No

Thyroid Dysfunction:  No

Medical History Comments:  patient adopted 

   

=================================================================

INFECTIOUS HISTORY

=================================================================

   

Gonorrhea:  No

Genital Herpes:  No

Chlamydia:  No

Tuberculosis:  No

Syphilis:  No

Hepatitis:  No

HIV/AIDS Exposure:  No

Rash or Viral Illness:  No

HPV:  No

   

=================================================================

PHYSICAL EXAM

=================================================================

   

General:  Normal

HEENT:  Normal

Neurologic:  Normal

Thyroid:  Deferred

Heart:  Normal

Lungs:  Normal

Breast:  Deferred

Back:  Normal

Abdomen:  Normal

Genitourinary Exam:  Normal

Extremities:  Normal

DTRs:  Normal

Pelvic Type:  Adequate

Vital Signs:  Reviewed

   

=================================================================

VAGINAL EXAM

=================================================================

   

Dilatation:  4

Effacement:  90

Station:  -1

Contraction Comments:  q 3-5

   

=================================================================

MEMBRANES

=================================================================

   

Membranes:  Intact

   

=================================================================

FETUS A

=================================================================

   

EGA:  39.4

Monitoring:  External US

FHR- Baseline:  145

Variability:  Moderate 6-25bpm

Accelerations:  15X15

Decelerations:  None

FHR Category:  Category I

Fetal Presentation:  Vertex

Admit Comment:  20yo  at 39+4ega presents for regular uterine

   contractions.  Vertex presentation.  GBS negative. Denies H/o HSV. 

   Admit to labor and delivery.  Undecided regarding epidural.  Labs

   ordered and drawn. Anticpate .  

   

=================================================================

PLANS FOR LABOR AND DELIVERY

=================================================================

   

Labor and Delivery:  None

Pain Management:  Natural

Feeding Preference:  Breast

Benefit of Breast Feed Discussed:  Yes

Circumcision:  N/A

   

=================================================================

INFORMED CONSENT

=================================================================

   

Informed Consent Obtained:  Vaginal Delivery; Risks, Benefits and

   Alternatives Discussed

Signature:  Electronically signed by MD Jerica BurkHOFKE) on

   3/28/2020 at 17:07  with User ID: [email or web address]

## 2020-03-29 LAB
ERYTHROCYTE [DISTWIDTH] IN BLOOD BY AUTOMATED COUNT: 11.8 % (ref 11.5–14)
HCT VFR BLD CALC: 37.2 % (ref 36–47)
HGB BLD-MCNC: 12.9 G/DL (ref 12–15.5)
MCH RBC QN AUTO: 31.8 PG (ref 27–33.4)
MCHC RBC AUTO-ENTMCNC: 34.6 G/DL (ref 32–36)
MCV RBC AUTO: 92 FL (ref 80–97)
PLATELET # BLD: 178 10^3/UL (ref 150–450)
RBC # BLD AUTO: 4.05 10^6/UL (ref 3.72–5.28)
WBC # BLD AUTO: 15.1 10^3/UL (ref 4–10.5)

## 2020-03-29 RX ADMIN — FAMOTIDINE SCH: 20 TABLET, FILM COATED ORAL at 09:38

## 2020-03-29 RX ADMIN — DOCUSATE SODIUM SCH MG: 100 CAPSULE, LIQUID FILLED ORAL at 09:36

## 2020-03-29 RX ADMIN — SENNOSIDES, DOCUSATE SODIUM SCH EACH: 50; 8.6 TABLET, FILM COATED ORAL at 09:36

## 2020-03-29 RX ADMIN — FERROUS SULFATE TAB 325 MG (65 MG ELEMENTAL FE) SCH MG: 325 (65 FE) TAB at 09:36

## 2020-03-29 RX ADMIN — FERROUS SULFATE TAB 325 MG (65 MG ELEMENTAL FE) SCH MG: 325 (65 FE) TAB at 17:43

## 2020-03-29 RX ADMIN — FAMOTIDINE SCH: 20 TABLET, FILM COATED ORAL at 21:56

## 2020-03-29 RX ADMIN — IBUPROFEN SCH MG: 800 TABLET, FILM COATED ORAL at 14:06

## 2020-03-29 RX ADMIN — IBUPROFEN SCH MG: 800 TABLET, FILM COATED ORAL at 21:55

## 2020-03-29 RX ADMIN — IBUPROFEN SCH MG: 800 TABLET, FILM COATED ORAL at 05:16

## 2020-03-29 RX ADMIN — Medication SCH CAP: at 09:36

## 2020-03-29 RX ADMIN — DOCUSATE SODIUM SCH MG: 100 CAPSULE, LIQUID FILLED ORAL at 17:43

## 2020-03-29 NOTE — PDOC PROGRESS REPORT
Subjective-OB


Progress Note for:: 03/29/20


Subjective: 


reports bleeding slowing, pain controlled with current meds. denies needs





Physical Exam (OB)


Vital Signs: 


                                        











Temp Pulse Resp BP Pulse Ox


 


 99.1 F   102 H  16   128/78 H  96 


 


 03/29/20 07:35  03/29/20 07:35  03/29/20 07:35  03/29/20 07:35  03/29/20 07:35








                                 Intake & Output











 03/28/20 03/29/20 03/30/20





 06:59 06:59 06:59


 


Weight  67.5 kg 














- Abdomen


Description: Soft, Round


Hernia Present: No


Fundal Description: Firm, Midline


Fundal Height: u/u - u/2





- Abdominal


Distension: No distension


Tenderness: Nontender





- Extremities


Lower extremities: Syl's sign - neg


Calf: Normal, Nontender





Objective-Diagnostic


Laboratory: 


                                        





                                 03/29/20 07:43 





                                        











  03/28/20 03/28/20 03/28/20





  16:21 17:00 17:00


 


WBC   13.5 H 


 


RBC   3.99 


 


Hgb   12.6 


 


Hct   36.8 


 


MCV   92 


 


MCH   31.7 


 


MCHC   34.3 


 


RDW   11.9 


 


Plt Count   194 


 


Seg Neutrophils %   80.7 H 


 


Urine Color  STRAW  


 


Urine Appearance  CLEAR  


 


Urine pH  8.0  


 


Ur Specific Gravity  1.005  


 


Urine Protein  NEGATIVE  


 


Urine Glucose (UA)  NEGATIVE  


 


Urine Ketones  NEGATIVE  


 


Urine Blood  NEGATIVE  


 


Urine Nitrite  NEGATIVE  


 


Ur Leukocyte Esterase  NEGATIVE  


 


Blood Type    A POSITIVE


 


Antibody Screen    NEGATIVE














  03/29/20





  07:43


 


WBC  15.1 H


 


RBC  4.05


 


Hgb  12.9


 


Hct  37.2


 


MCV  92


 


MCH  31.8


 


MCHC  34.6


 


RDW  11.8


 


Plt Count  178


 


Seg Neutrophils % 


 


Urine Color 


 


Urine Appearance 


 


Urine pH 


 


Ur Specific Gravity 


 


Urine Protein 


 


Urine Glucose (UA) 


 


Urine Ketones 


 


Urine Blood 


 


Urine Nitrite 


 


Ur Leukocyte Esterase 


 


Blood Type 


 


Antibody Screen 














Assessment and Plan(PN)





- Assessment and Plan


(1) Active labor at term


Is this a current diagnosis for this admission?: Yes   





(2) Vaginal delivery


Is this a current diagnosis for this admission?: Yes   





- Disposition


Anticipated Discharge: Home


Within: within 36 hours

## 2020-03-30 VITALS — DIASTOLIC BLOOD PRESSURE: 73 MMHG | SYSTOLIC BLOOD PRESSURE: 130 MMHG

## 2020-03-30 PROCEDURE — 3E0234Z INTRODUCTION OF SERUM, TOXOID AND VACCINE INTO MUSCLE, PERCUTANEOUS APPROACH: ICD-10-PCS | Performed by: OBSTETRICS & GYNECOLOGY

## 2020-03-30 RX ADMIN — FAMOTIDINE SCH: 20 TABLET, FILM COATED ORAL at 10:16

## 2020-03-30 RX ADMIN — FERROUS SULFATE TAB 325 MG (65 MG ELEMENTAL FE) SCH MG: 325 (65 FE) TAB at 09:10

## 2020-03-30 RX ADMIN — Medication SCH CAP: at 09:09

## 2020-03-30 RX ADMIN — DOCUSATE SODIUM SCH MG: 100 CAPSULE, LIQUID FILLED ORAL at 09:09

## 2020-03-30 RX ADMIN — SENNOSIDES, DOCUSATE SODIUM SCH EACH: 50; 8.6 TABLET, FILM COATED ORAL at 09:10

## 2020-03-30 RX ADMIN — IBUPROFEN SCH MG: 800 TABLET, FILM COATED ORAL at 05:14

## 2020-03-30 NOTE — PDOC DISCHARGE SUMMARY
Impression





- Admit/DC Date/PCP


Admission Date/Primary Care Provider: 


  03/28/20 16:48





  





Discharge Date: 03/30/20





- Discharge Diagnosis


(1) Vaginal delivery


Is this a current diagnosis for this admission?: Yes   





(2) Active labor at term


Is this a current diagnosis for this admission?: Yes   





- Additional Information


Resuscitation Status: Full Code


Discharge Diet: As Tolerated, Regular


Discharge Activity: Pelvic Rest


Referrals: 


DANGELO JUÁREZ MD [ACTIVE STAFF] -  ([facility] 4 weeks)


Home Medications: 








Prenatal Vitamin [Prenatal-U Multiple Vitamin Capsule] 1 tab PO DAILY 03/28/20 











HPI


Gestational Age: 39.4


Reason(s) for Admission: Onset of Labor


Prenatal Procedures: Ultrasound


Intrapartum Procedure(s): Spontaneous Vaginal Delivery





Hospital Course


Hospital Course: 


routine





Results


Laboratory Results: 


                                        











WBC  15.1 10^3/uL (4.0-10.5)  H  03/29/20  07:43    


 


RBC  4.05 10^6/uL (3.72-5.28)   03/29/20  07:43    


 


Hgb  12.9 g/dL (12.0-15.5)   03/29/20  07:43    


 


Hct  37.2 % (36.0-47.0)   03/29/20  07:43    


 


MCV  92 fl (80-97)   03/29/20  07:43    


 


MCH  31.8 pg (27.0-33.4)   03/29/20  07:43    


 


MCHC  34.6 g/dL (32.0-36.0)   03/29/20  07:43    


 


RDW  11.8 % (11.5-14.0)   03/29/20  07:43    


 


Plt Count  178 10^3/uL (150-450)   03/29/20  07:43    


 


Lymph % (Auto)  11.1 % (13-45)  L  03/28/20  17:00    


 


Mono % (Auto)  7.8 % (3-13)   03/28/20  17:00    


 


Eos % (Auto)  0.2 % (0-6)   03/28/20  17:00    


 


Baso % (Auto)  0.2 % (0-2)   03/28/20  17:00    


 


Absolute Neuts (auto)  10.9 10^3/uL (1.7-8.2)  H  03/28/20  17:00    


 


Absolute Lymphs (auto)  1.5 10^3/uL (0.5-4.7)   03/28/20  17:00    


 


Absolute Monos (auto)  1.1 10^3/uL (0.1-1.4)   03/28/20  17:00    


 


Absolute Eos (auto)  0.0 10^3/uL (0.0-0.6)   03/28/20  17:00    


 


Absolute Basos (auto)  0.0 10^3/uL (0.0-0.2)   03/28/20  17:00    


 


Seg Neutrophils %  80.7 % (42-78)  H  03/28/20  17:00    


 


Urine Color  STRAW   03/28/20  16:21    


 


Urine Appearance  CLEAR   03/28/20  16:21    


 


Urine pH  8.0  (5.0-9.0)   03/28/20  16:21    


 


Ur Specific Gravity  1.005   03/28/20  16:21    


 


Urine Protein  NEGATIVE mg/dL (NEGATIVE)   03/28/20  16:21    


 


Urine Glucose (UA)  NEGATIVE mg/dL (NEGATIVE)   03/28/20  16:21    


 


Urine Ketones  NEGATIVE mg/dL (NEGATIVE)   03/28/20  16:21    


 


Urine Blood  NEGATIVE  (NEGATIVE)   03/28/20  16:21    


 


Urine Nitrite  NEGATIVE  (NEGATIVE)   03/28/20  16:21    


 


Urine Bilirubin  NEGATIVE  (NEGATIVE)   03/28/20  16:21    


 


Urine Urobilinogen  NEGATIVE mg/dL (<2.0)   03/28/20  16:21    


 


Ur Leukocyte Esterase  NEGATIVE  (NEGATIVE)   03/28/20  16:21    


 


Urine Ascorbic Acid  NEGATIVE  (NEGATIVE)   03/28/20  16:21    


 


Urine Opiates Screen  NEGATIVE   03/28/20  16:21    


 


Urine Methadone Screen  NEGATIVE   03/28/20  16:21    


 


Ur Barbiturates Screen  NEGATIVE   03/28/20  16:21    


 


Ur Phencyclidine Scrn  NEGATIVE   03/28/20  16:21    


 


Ur Amphetamines Screen  NEGATIVE   03/28/20  16:21    


 


U Benzodiazepines Scrn  NEGATIVE   03/28/20  16:21    


 


Urine Cocaine Screen  NEGATIVE   03/28/20  16:21    


 


U Marijuana (THC) Screen  NEGATIVE   03/28/20  16:21    


 


RPR  NONREACTIVE  (NONREACTIVE)   03/28/20  17:00    


 


Blood Type  A POSITIVE   03/28/20  17:00    


 


Antibody Screen  NEGATIVE   03/28/20  17:00    














Plan


Health Concerns: 


normal pp


Plan of Treatment: 


routine pp care, rev S&S to report


Goals: 


no complications


Time Spent: Less than 30 Minutes

## 2020-03-30 NOTE — PDOC PROGRESS REPORT
Subjective-OB


Progress Note for:: 03/30/20


Subjective: 





Sitting up in bed, ready to go home, no c/o, scant bleeding, breastfeeding





Physical Exam (OB)


Vital Signs: 


                                        











Temp Pulse Resp BP Pulse Ox


 


 97.6 F   73   16   139/55 H  99 


 


 03/30/20 07:27  03/30/20 07:27  03/30/20 07:27  03/30/20 07:27  03/30/20 07:27








                                 Intake & Output











 03/29/20 03/30/20 03/31/20





 06:59 06:59 06:59


 


Intake Total  800 


 


Balance  800 


 


Weight 67.5 kg  














- PIH/Pre-Eclampsia


DTR's: 1 +


Clonus: Negative


Headache: Absent


Epigastric Pain: No


Visual Changes: No





- Lochia


Lochia Amount: Small 10-25 ml


Lochia Color: Rubra/Red





- Abdomen


Description: Soft


Hernia Present: No


Fundal Description: Firm, Midline


Fundal Height: u/u - u/2





Objective-Diagnostic


Laboratory: 


                                        





                                 03/29/20 07:43 











Assessment and Plan(PN)





- Assessment and Plan


(1) Vaginal delivery


Is this a current diagnosis for this admission?: Yes   





(2) Active labor at term


Is this a current diagnosis for this admission?: Yes   





- Time Spent with Patient


Time with patient: Less than 15 minutes


Medications reviewed and adjusted accordingly: Yes





- Disposition


Anticipated Discharge: Home


Within: within 24 hours